# Patient Record
Sex: FEMALE | Race: OTHER
[De-identification: names, ages, dates, MRNs, and addresses within clinical notes are randomized per-mention and may not be internally consistent; named-entity substitution may affect disease eponyms.]

---

## 2018-07-23 ENCOUNTER — HOSPITAL ENCOUNTER (EMERGENCY)
Dept: HOSPITAL 76 - ED | Age: 23
LOS: 1 days | Discharge: HOME | End: 2018-07-24
Payer: COMMERCIAL

## 2018-07-23 DIAGNOSIS — S20.212A: Primary | ICD-10-CM
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
## 2018-07-23 PROCEDURE — 99283 EMERGENCY DEPT VISIT LOW MDM: CPT

## 2018-07-23 PROCEDURE — 71101 X-RAY EXAM UNILAT RIBS/CHEST: CPT

## 2018-07-23 PROCEDURE — 1040M: CPT

## 2018-07-23 NOTE — ED PHYSICIAN DOCUMENTATION
PD HPI TRUNK INJURY





- Stated complaint


Stated Complaint: LF BREAST/SHOULDER PX





- Chief complaint


Chief Complaint: General





- History obtained from


History obtained from: Patient





- History of Present Illness


Location: Anterior chest


Type of injury: Fall


Timing - onset: Enter  time (1700), Today


Timing - duration: Hours


Timing - details: Abrupt onset, Still present


Quality: Pain, Spasm, Sharp


Improved by: Rest


Worsened by: Moving, Palpating


Associated symtptoms: No: Weakness, Numbness, Tingling, Swelling, Discoloration

, Feel faint, Syncope


Contributing factors: No: Anticoagulated


Where injury occured: Work


Similar symptoms before: Has not had sx before


Recently seen: Not recently seen





- Additional information


Additional information: 





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 

today at work she tripped over a walker and landed on the walker in her left 

upper chest.  This happened about 5:00 tonight and she continues to have pain 

in the area especially if she tries to take a deep breath or move her left arm.

  She has pain in the left shoulder as well this related to the pain in her 

chest.





Review of Systems


Constitutional: denies: Fever


Eyes: denies: Decreased vision, Photophobia


Ears: denies: Ear pain


Nose: denies: Rhinorrhea / runny nose, Congestion


Throat: denies: Sore throat


Cardiac: reports: Chest pain / pressure.  denies: Palpitations, Pedal edema, 

Calf pain


Respiratory: denies: Dyspnea, Cough, Wheezing


GI: denies: Abdominal Pain, Nausea, Vomiting


: denies: Dysuria, Frequency





PD PAST MEDICAL HISTORY





- Present Medications


Home Medications: 


 Ambulatory Orders











 Medication  Instructions  Recorded  Confirmed


 


HYDROcod/ACETAM 5/325 [Norco 5/325] 1 - 2 ea PO Q6H PRN #15 tablet 07/24/18 














- Allergies


Allergies/Adverse Reactions: 


 Allergies











Allergy/AdvReac Type Severity Reaction Status Date / Time


 


No Known Drug Allergies Allergy   Verified 07/23/18 20:52














PD ED PE NORMAL





- Vitals


Vital signs reviewed: Yes (hypertensive systolic mild )





- General


General: Alert and oriented X 3, No acute distress, Well developed/nourished





- HEENT


HEENT: Atraumatic, PERRL, EOMI





- Neck


Neck: Supple, no meningeal sign, No bony TTP





- Cardiac


Cardiac: RRR, No murmur





- Respiratory


Respiratory: No respiratory distress, Clear bilaterally, Other (marked point 

tenderness to the anterior chest wall on the left just below the clavicle. No 

evidence of bruising and no crepitance. )





- Abdomen


Abdomen: Soft, Non tender





- Back


Back: No CVA TTP, No spinal TTP





- Derm


Derm: Normal color, Warm and dry, No rash





- Extremities


Extremities: No deformity, No edema, Other (There is some tenderness to the 

shoulder itself but normal ROM without pain passively )





- Neuro


Neuro: Alert and oriented X 3, CNs 2-12 intact, No motor deficit, No sensory 

deficit, Normal speech


Eye Opening: Spontaneous


Motor: Obeys Commands


Verbal: Oriented


GCS Score: 15





- Psych


Psych: Normal mood, Normal affect





Results





- Vitals


Vitals: 


 Vital Signs - 24 hr











  07/23/18 07/24/18





  20:51 01:05


 


Temperature 36.6 C 


 


Heart Rate 78 72


 


Respiratory 18 16





Rate  


 


Blood Pressure 131/67 H 129/74


 


O2 Saturation 99 98








 Oxygen











O2 Source                      Room air

















- Rads (name of study)


  ** PA chest and left ribs


Radiology: Prelim report reviewed (Impression: Negative chest and rib 

radiography.), EMP read indepedently, See rad report





PD MEDICAL DECISION MAKING





- ED course


Complexity details: reviewed results, re-evaluated patient, considered 

differential, d/w patient


ED course: 





23 y/o female with a chest wall contusion does not appear to have rib fracture. 

She is tender to the chest wall. 





- Sepsis Event


Vital Signs: 


 Vital Signs - 24 hr











  07/23/18 07/24/18





  20:51 01:05


 


Temperature 36.6 C 


 


Heart Rate 78 72


 


Respiratory 18 16





Rate  


 


Blood Pressure 131/67 H 129/74


 


O2 Saturation 99 98








 Oxygen











O2 Source                      Room air

















Departure





- Departure


Disposition: 01 Home, Self Care


Clinical Impression: 


Chest wall contusion


Qualifiers:


 Encounter type: initial encounter Laterality: left Qualified Code(s): S20.212A 

- Contusion of left front wall of thorax, initial encounter





Condition: Stable


Instructions:  ED Contusion Chest Wall


Follow-Up: 


[facility] [place] [Provider Group]


Prescriptions: 


HYDROcod/ACETAM 5/325 [Norco 5/325] 1 - 2 ea PO Q6H PRN #15 tablet


 PRN Reason: Pain


Forms:  Activity restrictions


Discharge Date/Time: 07/24/18 01:05

## 2018-07-24 VITALS — DIASTOLIC BLOOD PRESSURE: 74 MMHG | SYSTOLIC BLOOD PRESSURE: 129 MMHG

## 2018-07-24 NOTE — XRAY REPORT
Procedure Date:  07/24/2018   

Accession Number:  [id number] / [id number]                    

Procedure:  XR  - Ribs w/PA Chest LT CPT Code:  

 

FULL RESULT:

 

 

EXAM:

LEFT RIB RADIOGRAPHY

 

EXAM DATE: 7/24/2018 12:37 AM.

 

CLINICAL HISTORY: Chest pain

 

COMPARISON: None.

 

TECHNIQUE: 1 view of the chest and 2 views of the ribs.

 

FINDINGS:

Bones: Normal. No fracture or bone lesion.

 

Lungs: No focal opacities. No pneumothorax. No pleural effusions.

 

Mediastinum: Heart and mediastinal contours are unremarkable.

 

Other: None.

IMPRESSION: Negative chest and rib radiography.

 

[facility]